# Patient Record
Sex: MALE | Race: WHITE | Employment: STUDENT | URBAN - METROPOLITAN AREA
[De-identification: names, ages, dates, MRNs, and addresses within clinical notes are randomized per-mention and may not be internally consistent; named-entity substitution may affect disease eponyms.]

---
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
## 2020-07-13 NOTE — PROGRESS NOTES
[facility] - PRIMARY CARE SKIN    CC: Lesion(s)  SUBJECTIVE:   Charles Costa is a(n) 19 year old male who presents to clinic today for following issues :    Issue one : lesion on the left hand . This has gotten tender if it is hit.     Issue two: face acne on the cheeks , has been an issue for years but worse lately.Treatment : OTC. No back or chest acne.    Personal Medical History  Skin cancer: NO  Eczema Psoriasis Lupus   NO NO NO   Other:   .    Family Medical History  Skin cancer:GM- nonmelanoma  Eczema Psoriasis Lupus   NO NO NO   Other:   .    Sun Exposure History    Sunscreen usage:SPF - yes    Occupation: student    Refer to electronic medical record (EMR) for past medical history and medications.      ROS: 14 point review of systems was negative except the symptoms listed above in the HPI.          OBJECTIVE:   GENERAL: healthy, alert and no distress.  HEENT: PERRL. Conjunctiva, sclera clear.  SKIN: Mckeon Skin Type - III.  Face and Hands examined. The dermatoscope was used to help evaluate pigmented lesions.  Skin Pertinent Findings:  Left dorsum of the hand- 2 cm smooth over the dorsum of the hand at base of second metacarpal                     Name : Aspiration  Indication : Aspiration of ganglion cyst.  Completed by : Cierra Garcia MD  Photo Taken : no.  Anesthesia : Patient was anesthetized by infiltrating the area surrounding the lesion with 1% lidocaine.   Note : Discussed risks of the excision procedure, including pain, infection, scarring, hypopigmentation, hyperpigmentation and potential for recurrence or need for re-treatment. Discussed that definitive removal may require referral to hand surgeon. Benefits of treatment and alternative treatments were also discussed.    During this procedure, the universal protocol was utilized. The patient's identity was confirmed by no less than two patient identifiers, correct procedure was verified, correct site was verified and marked as applicable  "and a final pause was completed.    Sterile technique was used throughout the procedure. The skin was cleaned and prepped with Chloroprep. A #11 blade  was used make a small stab and express  clear, gelatinous material.     Culture was not obtained.    Tissue samples submitted : NO.    Irrigated with sterile saline: No.    Direct pressure was applied for hemostasis. No bleeding was present upon the completion of the procedure. A dry sterile dressing and antibacterial ointment was applied. Patient tolerated the procedure well and was discharged in satisfactory condition.                             Face - mild to moderate scarring, scattered inflammatory papules and some nodules  ASSESSMENT:     Encounter Diagnoses   Name Primary?     Ganglion cyst of wrist, left Yes     Acne vulgaris      MDM: discussed options of repeat aspiration X 3 , if still persisting then surgery .    PLAN:   Patient Instructions   FUTURE APPOINTMENTS  Recheck in 6-8 weeks    ACNE TREATMENT PLAN  Morning or Evening (whenever you shower) :    Cetaphil face wash.    Do a \"20/10\" wash (20 seconds of skin contact with the cleanser followed by a 10 second rinse)    Apply to face.  (FYI Note - Benzoyl peroxide washes can bleach clothing, so try to use disposable or old towels and clothes.)    After cleansing, medication : benzaclin gel     Evening or Morning (other time of day) :    Cetaphil facial cleanser - Do a \"20 / 10 wash\" again.    After cleansing, medication: apply topical adapalene (Differin) 0.1% gel  ( this is available OTC)    Recommendations if skin becomes too dry in response to medication:    Use Cetaphil facial moisturizer.    Alternate application of Adapalene gel 0.1 every other night for 2 weeks, to condition the skin. After 2 weeks, retry nightly application.    Make an appointment for 2 weeks for possible repeat aspiration    Wear band aid at the aspiration site for 3 days      TT: 20 minutes.  CT: 15 minutes.      "

## 2020-07-14 ENCOUNTER — OFFICE VISIT (OUTPATIENT)
Dept: FAMILY MEDICINE | Facility: CLINIC | Age: 20
End: 2020-07-14
Payer: COMMERCIAL

## 2020-07-14 VITALS — SYSTOLIC BLOOD PRESSURE: 122 MMHG | DIASTOLIC BLOOD PRESSURE: 84 MMHG

## 2020-07-14 DIAGNOSIS — M67.432 GANGLION CYST OF WRIST, LEFT: Primary | ICD-10-CM

## 2020-07-14 DIAGNOSIS — L70.0 ACNE VULGARIS: ICD-10-CM

## 2020-07-14 PROCEDURE — 99213 OFFICE O/P EST LOW 20 MIN: CPT | Mod: 25 | Performed by: FAMILY MEDICINE

## 2020-07-14 PROCEDURE — 10160 PNXR ASPIR ABSC HMTMA BULLA: CPT | Performed by: FAMILY MEDICINE

## 2020-07-14 RX ORDER — CLINDAMYCIN AND BENZOYL PEROXIDE 10; 50 MG/G; MG/G
GEL TOPICAL
Qty: 50 G | Refills: 1 | Status: SHIPPED | OUTPATIENT
Start: 2020-07-14 | End: 2021-03-11

## 2020-07-14 NOTE — LETTER
7/14/2020         RE: Charles Costa  [address]  [place] [address]        Dear Colleague,    Thank you for referring your patient, Charles Costa, to the [facility] [place]. Please see a copy of my visit note below.    [facility] - PRIMARY CARE SKIN    CC: Lesion(s)  SUBJECTIVE:   Charles Costa is a(n) 19 year old male who presents to clinic today for following issues :    Issue one : lesion on the left hand . This has gotten tender if it is hit.     Issue two: face acne on the cheeks , has been an issue for years but worse lately.Treatment : OTC. No back or chest acne.    Personal Medical History  Skin cancer: NO  Eczema Psoriasis Lupus   NO NO NO   Other:   .    Family Medical History  Skin cancer:GM- nonmelanoma  Eczema Psoriasis Lupus   NO NO NO   Other:   .    Sun Exposure History    Sunscreen usage:SPF - yes    Occupation: student    Refer to electronic medical record (EMR) for past medical history and medications.      ROS: 14 point review of systems was negative except the symptoms listed above in the HPI.          OBJECTIVE:   GENERAL: healthy, alert and no distress.  HEENT: PERRL. Conjunctiva, sclera clear.  SKIN: Mckeon Skin Type - III.  Face and Hands examined. The dermatoscope was used to help evaluate pigmented lesions.  Skin Pertinent Findings:  Left dorsum of the hand- 2 cm smooth over the dorsum of the hand at base of second metacarpal                     Name : Aspiration  Indication : Aspiration of ganglion cyst.  Completed by : Cierra Garcia MD  Photo Taken : no.  Anesthesia : Patient was anesthetized by infiltrating the area surrounding the lesion with 1% lidocaine.   Note : Discussed risks of the excision procedure, including pain, infection, scarring, hypopigmentation, hyperpigmentation and potential for recurrence or need for re-treatment. Discussed that definitive removal may require referral to hand surgeon. Benefits of treatment and alternative treatments were  "also discussed.    During this procedure, the universal protocol was utilized. The patient's identity was confirmed by no less than two patient identifiers, correct procedure was verified, correct site was verified and marked as applicable and a final pause was completed.    Sterile technique was used throughout the procedure. The skin was cleaned and prepped with Chloroprep. A #11 blade  was used make a small stab and express  clear, gelatinous material.     Culture was not obtained.    Tissue samples submitted : NO.    Irrigated with sterile saline: No.    Direct pressure was applied for hemostasis. No bleeding was present upon the completion of the procedure. A dry sterile dressing and antibacterial ointment was applied. Patient tolerated the procedure well and was discharged in satisfactory condition.                             Face - mild to moderate scarring, scattered inflammatory papules and some nodules  ASSESSMENT:     Encounter Diagnoses   Name Primary?     Ganglion cyst of wrist, left Yes     Acne vulgaris      MDM: discussed options of repeat aspiration X 3 , if still persisting then surgery .    PLAN:   Patient Instructions   FUTURE APPOINTMENTS  Recheck in 6-8 weeks    ACNE TREATMENT PLAN  Morning or Evening (whenever you shower) :    Cetaphil face wash.    Do a \"20/10\" wash (20 seconds of skin contact with the cleanser followed by a 10 second rinse)    Apply to face.  (FYI Note - Benzoyl peroxide washes can bleach clothing, so try to use disposable or old towels and clothes.)    After cleansing, medication : benzaclin gel     Evening or Morning (other time of day) :    Cetaphil facial cleanser - Do a \"20 / 10 wash\" again.    After cleansing, medication: apply topical adapalene (Differin) 0.1% gel  ( this is available OTC)    Recommendations if skin becomes too dry in response to medication:    Use Cetaphil facial moisturizer.    Alternate application of Adapalene gel 0.1 every other night for 2 " weeks, to condition the skin. After 2 weeks, retry nightly application.    Make an appointment for 2 weeks for possible repeat aspiration    Wear band aid at the aspiration site for 3 days      TT: 20 minutes.  CT: 15 minutes.        Again, thank you for allowing me to participate in the care of your patient.        Sincerely,        Remedios Garcia MD

## 2020-07-14 NOTE — PATIENT INSTRUCTIONS
"FUTURE APPOINTMENTS  Recheck in 6-8 weeks    ACNE TREATMENT PLAN  Morning or Evening (whenever you shower) :    Cetaphil face wash.    Do a \"20/10\" wash (20 seconds of skin contact with the cleanser followed by a 10 second rinse)    Apply to face.  (JULIO Note - Benzoyl peroxide washes can bleach clothing, so try to use disposable or old towels and clothes.)    After cleansing, medication : benzaclin gel     Evening or Morning (other time of day) :    Cetaphil facial cleanser - Do a \"20 / 10 wash\" again.    After cleansing, medication: apply topical adapalene (Differin) 0.1% gel  ( this is available OTC)    Recommendations if skin becomes too dry in response to medication:    Use Cetaphil facial moisturizer.    Alternate application of Adapalene gel 0.1 every other night for 2 weeks, to condition the skin. After 2 weeks, retry nightly application.    Make an appointment for 2 weeks for possible repeat aspiration    Wear band aid at the aspiration site for 3 days  "

## 2020-07-15 ENCOUNTER — TELEPHONE (OUTPATIENT)
Dept: FAMILY MEDICINE | Facility: CLINIC | Age: 20
End: 2020-07-15

## 2020-07-15 NOTE — TELEPHONE ENCOUNTER
Reason for Call:  Other prescription    Detailed comments: Charles is calling with a question on clindamycin. He would like a call back.    Phone Number Patient can be reached at: Cell number on file:    Telephone Information:   Mobile [phone]     Best Time: Anytime    Can we leave a detailed message on this number? Not Applicable    Call taken on 7/15/2020 at 2:34 PM by Anisa Lopes

## 2020-07-15 NOTE — TELEPHONE ENCOUNTER
Left voice mail to call back- if no answer please leave a detailed message with what the question is.    Nadine GONZALEZRN BSN  [facility]  [phone]

## 2020-07-16 NOTE — TELEPHONE ENCOUNTER
Patient states that he thought there were two medications at the pharmacy- adapalene (defferin gel) is OTC patient states he does have that at home- just misunderstood. Went over the AVS instructions.  Patient verbalized understanding    Nadine GONZALEZ RN BSN  [facility]  [phone]

## 2020-11-24 NOTE — PROGRESS NOTES
[facility] - PRIMARY CARE SKIN    CC: Lesion(s)  SUBJECTIVE:   Charles Costa is a(n) 20 year old male who presents to clinic today for following issue(s) :    Issue One: history of ganglion cyst on the left hand, previous drainage but now the cyst has redeveloped. Recently started working out in the gym.    Personal Medical History  Skin cancer: NO  Eczema Psoriasis Lupus   NO NO NO   Other:   .    Family Medical History  Skin cancer: NO  Eczema Psoriasis Lupus   NO NO NO   Other:   .  Occupation: student    Refer to electronic medical record (EMR) for past medical history and medications.      ROS: 14 point review of systems was negative except the symptoms listed above in the HPI.    OBJECTIVE:   GENERAL: healthy, alert and no distress.  HEENT: PERRL. Conjunctiva, sclera clear.  SKIN: Mckeon Skin Type - III.  Hands examined. The dermatoscope was used to help evaluate pigmented lesions.  Skin Pertinent Findings:  Left dorsal hand .base of second metacarpal                     Name : Aspiration  Indication : Aspiration of ganglion cyst.  Completed by : Cierra Garcia MD  Photo Taken : no.  Anesthesia : Patient was anesthetized by infiltrating the area surrounding the lesion with 1% lidocaine.   Note : Discussed risks of the excision procedure, including pain, infection, scarring, hypopigmentation, hyperpigmentation and potential for recurrence or need for re-treatment. Discussed that definitive removal may require referral to hand surgeon. Benefits of treatment and alternative treatments were also discussed.     During this procedure, the universal protocol was utilized. The patient's identity was confirmed by no less than two patient identifiers, correct procedure was verified, correct site was verified and marked as applicable and a final pause was completed.     Sterile technique was used throughout the procedure. The skin was cleaned and prepped with Chloroprep. A #11 blade  was used make a small stab and  express  clear, gelatinous material.   Direct pressure was applied for hemostasis. No bleeding was present upon the completion of the procedure. A dry sterile dressing and antibacterial ointment was applied. Patient tolerated the procedure well and was discharged in satisfactory condition.    ASSESSMENT:     Encounter Diagnosis   Name Primary?     Ganglion cyst of wrist, left Yes       PLAN:   Patient Instructions   Recheck if it should recur  Use vaseline and band aid on the area of puncture for the next 3 days      TT: 20 minutes.  CT: 15 minutes.

## 2020-11-25 ENCOUNTER — OFFICE VISIT (OUTPATIENT)
Dept: FAMILY MEDICINE | Facility: CLINIC | Age: 20
End: 2020-11-25
Payer: COMMERCIAL

## 2020-11-25 VITALS — SYSTOLIC BLOOD PRESSURE: 124 MMHG | DIASTOLIC BLOOD PRESSURE: 74 MMHG

## 2020-11-25 DIAGNOSIS — M67.432 GANGLION CYST OF WRIST, LEFT: Primary | ICD-10-CM

## 2020-11-25 PROCEDURE — 99207 PR NO CHARGE LOS: CPT | Performed by: FAMILY MEDICINE

## 2020-11-25 PROCEDURE — 10060 I&D ABSCESS SIMPLE/SINGLE: CPT | Performed by: FAMILY MEDICINE

## 2020-11-25 NOTE — LETTER
11/25/2020         RE: Charles Costa  [address]  [place] [address]        Dear Colleague,    Thank you for referring your patient, Charles Costa, to the [facility]. Please see a copy of my visit note below.    [facility] - PRIMARY CARE SKIN    CC: Lesion(s)  SUBJECTIVE:   Charles Costa is a(n) 20 year old male who presents to clinic today for following issue(s) :    Issue One: history of ganglion cyst on the left hand, previous drainage but now the cyst has redeveloped. Recently started working out in the gym.    Personal Medical History  Skin cancer: NO  Eczema Psoriasis Lupus   NO NO NO   Other:   .    Family Medical History  Skin cancer: NO  Eczema Psoriasis Lupus   NO NO NO   Other:   .  Occupation: student    Refer to electronic medical record (EMR) for past medical history and medications.      ROS: 14 point review of systems was negative except the symptoms listed above in the HPI.    OBJECTIVE:   GENERAL: healthy, alert and no distress.  HEENT: PERRL. Conjunctiva, sclera clear.  SKIN: Mckeon Skin Type - III.  Hands examined. The dermatoscope was used to help evaluate pigmented lesions.  Skin Pertinent Findings:  Left dorsal hand .base of second metacarpal                     Name : Aspiration  Indication : Aspiration of ganglion cyst.  Completed by : Cierra Garcia MD  Photo Taken : no.  Anesthesia : Patient was anesthetized by infiltrating the area surrounding the lesion with 1% lidocaine.   Note : Discussed risks of the excision procedure, including pain, infection, scarring, hypopigmentation, hyperpigmentation and potential for recurrence or need for re-treatment. Discussed that definitive removal may require referral to hand surgeon. Benefits of treatment and alternative treatments were also discussed.     During this procedure, the universal protocol was utilized. The patient's identity was confirmed by no less than two patient identifiers, correct  procedure was verified, correct site was verified and marked as applicable and a final pause was completed.     Sterile technique was used throughout the procedure. The skin was cleaned and prepped with Chloroprep. A #11 blade  was used make a small stab and express  clear, gelatinous material.   Direct pressure was applied for hemostasis. No bleeding was present upon the completion of the procedure. A dry sterile dressing and antibacterial ointment was applied. Patient tolerated the procedure well and was discharged in satisfactory condition.    ASSESSMENT:     Encounter Diagnosis   Name Primary?     Ganglion cyst of wrist, left Yes       PLAN:   Patient Instructions   Recheck if it should recur  Use vaseline and band aid on the area of puncture for the next 3 days      TT: 20 minutes.  CT: 15 minutes.          Again, thank you for allowing me to participate in the care of your patient.        Sincerely,        Remedios Garcia MD

## 2021-03-11 DIAGNOSIS — L70.0 ACNE VULGARIS: ICD-10-CM

## 2021-03-11 RX ORDER — CLINDAMYCIN AND BENZOYL PEROXIDE 10; 50 MG/G; MG/G
GEL TOPICAL
Qty: 50 G | Refills: 1 | Status: SHIPPED | OUTPATIENT
Start: 2021-03-11

## 2021-06-22 ENCOUNTER — TELEPHONE (OUTPATIENT)
Dept: FAMILY MEDICINE | Facility: CLINIC | Age: 21
End: 2021-06-22

## 2021-06-22 DIAGNOSIS — L70.0 ACNE VULGARIS: Primary | ICD-10-CM

## 2021-06-22 NOTE — TELEPHONE ENCOUNTER
[facility] Call Center    Phone Message    May a detailed message be left on voicemail: yes     Reason for Call: Medication Question or concern regarding medication   Prescription Clarification  Name of Medication: clindamycin-benzoyl peroxide (BENZACLIN) 1-5 % external gel  Prescribing Provider: Dr. Garcia   Pharmacy: [id number] [id number] AT [facility] [address]   What on the order needs clarification? Pt called and said that this medication is not covered by his insurance. Pt was wondering if there is an alternative. Please call the pt back. Thanks          Action Taken: Message routed to:  Other: [facility] derm    Travel Screening: Not Applicable

## 2021-06-22 NOTE — TELEPHONE ENCOUNTER
Split medication to see if insurance will pay for this.    Nadine GONZALEZRN BSN  [facility]  [phone]

## 2021-06-23 RX ORDER — BENZOYL PEROXIDE 5 G/100G
GEL TOPICAL DAILY
Qty: 90 G | Refills: 4 | Status: SHIPPED | OUTPATIENT
Start: 2021-06-23

## 2021-06-23 RX ORDER — CLINDAMYCIN PHOSPHATE 10 MG/G
GEL TOPICAL EVERY MORNING
Qty: 30 G | Refills: 11 | Status: SHIPPED | OUTPATIENT
Start: 2021-06-23